# Patient Record
Sex: MALE | Race: WHITE
[De-identification: names, ages, dates, MRNs, and addresses within clinical notes are randomized per-mention and may not be internally consistent; named-entity substitution may affect disease eponyms.]

---

## 2018-12-08 ENCOUNTER — HOSPITAL ENCOUNTER (OUTPATIENT)
Dept: HOSPITAL 53 - M SLEEP | Age: 58
End: 2018-12-08
Payer: COMMERCIAL

## 2018-12-08 DIAGNOSIS — G47.33: Primary | ICD-10-CM

## 2018-12-08 PROCEDURE — 95811 POLYSOM 6/>YRS CPAP 4/> PARM: CPT

## 2019-01-23 ENCOUNTER — HOSPITAL ENCOUNTER (OUTPATIENT)
Dept: HOSPITAL 53 - M LAB REF | Age: 59
End: 2019-01-23
Attending: FAMILY MEDICINE
Payer: COMMERCIAL

## 2019-01-23 DIAGNOSIS — R68.82: Primary | ICD-10-CM

## 2019-10-24 ENCOUNTER — HOSPITAL ENCOUNTER (OUTPATIENT)
Dept: HOSPITAL 53 - M LAB REF | Age: 59
End: 2019-10-24
Attending: FAMILY MEDICINE
Payer: COMMERCIAL

## 2019-10-24 DIAGNOSIS — R53.83: ICD-10-CM

## 2019-10-24 DIAGNOSIS — M25.50: Primary | ICD-10-CM

## 2019-10-24 LAB
CRP SERPL-MCNC: < 0.3 MG/DL (ref 0–0.3)
RHEUMATOID FACT SERPL-ACNC: < 10 IU/ML (ref ?–15)

## 2019-10-26 LAB
B BURGDOR IGG+IGM SER-ACNC: <0.91 ISR (ref 0–0.9)
B BURGDOR IGM SER IA-ACNC: <0.8 INDEX (ref 0–0.79)
DSDNA AB SER-ACNC: 1 IU/ML (ref 0–9)
ENA RNP AB SER-ACNC: 1.3 AI (ref 0–0.9)
ENA SM AB SER-ACNC: <0.2 AI (ref 0–0.9)
ENA SS-A AB SER-ACNC: <0.2 AI (ref 0–0.9)
ENA SS-B AB SER-ACNC: <0.2 AI (ref 0–0.9)

## 2020-08-26 ENCOUNTER — HOSPITAL ENCOUNTER (OUTPATIENT)
Dept: HOSPITAL 53 - M LAB REF | Age: 60
End: 2020-08-26
Attending: FAMILY MEDICINE
Payer: COMMERCIAL

## 2020-08-26 DIAGNOSIS — Z79.899: Primary | ICD-10-CM

## 2020-08-28 LAB — RBC # BLD AUTO: 5.2 X10E6/UL (ref 4.14–5.8)

## 2021-04-19 ENCOUNTER — HOSPITAL ENCOUNTER (OUTPATIENT)
Dept: HOSPITAL 53 - M LAB REF | Age: 61
End: 2021-04-19
Attending: FAMILY MEDICINE
Payer: COMMERCIAL

## 2021-04-19 DIAGNOSIS — Z79.899: ICD-10-CM

## 2021-04-19 DIAGNOSIS — M06.9: ICD-10-CM

## 2021-04-19 DIAGNOSIS — Z51.81: Primary | ICD-10-CM

## 2021-07-02 ENCOUNTER — HOSPITAL ENCOUNTER (EMERGENCY)
Dept: HOSPITAL 53 - M ED | Age: 61
Discharge: HOME | End: 2021-07-02
Payer: COMMERCIAL

## 2021-07-02 VITALS — DIASTOLIC BLOOD PRESSURE: 94 MMHG | SYSTOLIC BLOOD PRESSURE: 165 MMHG

## 2021-07-02 VITALS — HEIGHT: 68 IN | WEIGHT: 283.07 LBS | BODY MASS INDEX: 42.9 KG/M2

## 2021-07-02 DIAGNOSIS — Z88.8: ICD-10-CM

## 2021-07-02 DIAGNOSIS — F41.9: ICD-10-CM

## 2021-07-02 DIAGNOSIS — F32.9: ICD-10-CM

## 2021-07-02 DIAGNOSIS — R51.9: ICD-10-CM

## 2021-07-02 DIAGNOSIS — L03.90: Primary | ICD-10-CM

## 2021-07-02 LAB
BASOPHILS NFR BLD MANUAL: 1 % (ref 0–1)
BUN SERPL-MCNC: 14 MG/DL (ref 7–18)
CALCIUM SERPL-MCNC: 8.4 MG/DL (ref 8.5–10.1)
CHLORIDE SERPL-SCNC: 104 MEQ/L (ref 98–107)
CO2 SERPL-SCNC: 25 MEQ/L (ref 21–32)
CREAT SERPL-MCNC: 1.24 MG/DL (ref 0.7–1.3)
CRP SERPL-MCNC: 2.99 MG/DL (ref 0–0.3)
EOSINOPHIL NFR BLD MANUAL: 2 % (ref 0–3)
ERYTHROCYTE [SEDIMENTATION RATE] IN BLOOD BY WESTERGREN METHOD: 25 MM/HR (ref 0–20)
GFR SERPL CREATININE-BSD FRML MDRD: > 60 ML/MIN/{1.73_M2} (ref 56–?)
GLUCOSE SERPL-MCNC: 93 MG/DL (ref 70–100)
HCT VFR BLD AUTO: 44.1 % (ref 42–52)
HETEROPH AB SER QL LA: NEGATIVE
HGB BLD-MCNC: 15.2 G/DL (ref 13.5–17.5)
LYMPHOCYTES NFR BLD MANUAL: 17 % (ref 16–44)
MCH RBC QN AUTO: 32.3 PG (ref 27–33)
MCHC RBC AUTO-ENTMCNC: 34.5 G/DL (ref 32–36.5)
MCV RBC AUTO: 93.6 FL (ref 80–96)
MONOCYTES NFR BLD MANUAL: 5 % (ref 0–5)
NEUTROPHILS NFR BLD MANUAL: 64 % (ref 28–66)
PLATELET # BLD AUTO: 226 10^3/UL (ref 150–450)
PLATELET BLD QL SMEAR: NORMAL
POTASSIUM SERPL-SCNC: 3.8 MEQ/L (ref 3.5–5.1)
RBC # BLD AUTO: 4.71 10^6/UL (ref 4.3–6.1)
SODIUM SERPL-SCNC: 137 MEQ/L (ref 136–145)
VARIANT LYMPHS NFR BLD MANUAL: 11 % (ref 0–5)
WBC # BLD AUTO: 10.2 10^3/UL (ref 4–10)

## 2021-07-03 LAB
APPEARANCE UR: CLEAR
BACTERIA UR QL AUTO: NEGATIVE
BILIRUB UR QL STRIP.AUTO: NEGATIVE
GLUCOSE UR QL STRIP.AUTO: NEGATIVE MG/DL
HGB UR QL STRIP.AUTO: (no result)
KETONES UR QL STRIP.AUTO: NEGATIVE MG/DL
LEUKOCYTE ESTERASE UR QL STRIP.AUTO: NEGATIVE
MUCOUS THREADS URNS QL MICRO: (no result)
NITRITE UR QL STRIP.AUTO: NEGATIVE
PH UR STRIP.AUTO: 5 UNITS (ref 5–9)
PROT UR QL STRIP.AUTO: NEGATIVE MG/DL
RBC # UR AUTO: 1 /HPF (ref 0–3)
SP GR UR STRIP.AUTO: 1.02 (ref 1–1.03)
SQUAMOUS #/AREA URNS AUTO: 0 /HPF (ref 0–6)
UROBILINOGEN UR QL STRIP.AUTO: 0.2 MG/DL (ref 0–2)
WBC #/AREA URNS AUTO: 0 /HPF (ref 0–3)

## 2021-07-03 NOTE — ECGEPIP
[facility] - ED

                                       

                                       Test Date:    2021

Pat Name:     SHELBY SHI            Department:   

Patient ID:   [id number]                 Room:         -

Gender:       Male                     Technician:   MURIEL

:          1960               Requested By: SHAWNA MURRAY PA-C.

Order Number: [id number]     Reading MD:   Maja Lundborg-Gray

                                 Measurements

Intervals                              Axis          

Rate:         67                       P:            20

OR:           154                      QRS:          6

QRSD:         80                       T:            -6

QT:           374                                    

QTc:          395                                    

                           Interpretive Statements

Normal sinus rhythm

Nonspecific ST T wave changes 

No prior ECG for comparison

Electronically Signed on 7-3-2021 6:16:13 EDT by Maja Lundborg-Gray

## 2021-07-06 LAB — EBV NA IGG SER-ACNC: >600 U/ML (ref 0–17.9)

## 2021-07-07 LAB
B BURGDOR IGG+IGM SER-ACNC: 2.58 ISR (ref 0–0.9)
B BURGDOR IGM SER IA-ACNC: 6.81 INDEX (ref 0–0.79)

## 2021-11-03 ENCOUNTER — HOSPITAL ENCOUNTER (OUTPATIENT)
Dept: HOSPITAL 53 - M LAB REF | Age: 61
End: 2021-11-03
Attending: FAMILY MEDICINE
Payer: COMMERCIAL

## 2021-11-03 DIAGNOSIS — R31.9: Primary | ICD-10-CM

## 2021-11-03 LAB
APPEARANCE UR: CLEAR
BACTERIA UR QL AUTO: NEGATIVE
BILIRUB UR QL STRIP.AUTO: NEGATIVE
CAOX CRY URNS QL MICRO: (no result)
GLUCOSE UR QL STRIP.AUTO: NEGATIVE MG/DL
HGB UR QL STRIP.AUTO: NEGATIVE
KETONES UR QL STRIP.AUTO: NEGATIVE MG/DL
LEUKOCYTE ESTERASE UR QL STRIP.AUTO: NEGATIVE
MUCOUS THREADS URNS QL MICRO: (no result)
NITRITE UR QL STRIP.AUTO: NEGATIVE
PH UR STRIP.AUTO: 7 UNITS (ref 5–9)
PROT UR QL STRIP.AUTO: NEGATIVE MG/DL
RBC # UR AUTO: 0 /HPF (ref 0–3)
SP GR UR STRIP.AUTO: 1.02 (ref 1–1.03)
SQUAMOUS #/AREA URNS AUTO: 0 /HPF (ref 0–6)
UROBILINOGEN UR QL STRIP.AUTO: 0.2 MG/DL (ref 0–2)
WBC #/AREA URNS AUTO: 0 /HPF (ref 0–3)

## 2021-11-15 ENCOUNTER — HOSPITAL ENCOUNTER (OUTPATIENT)
Dept: HOSPITAL 53 - M WUC | Age: 61
End: 2021-11-15
Attending: FAMILY MEDICINE
Payer: COMMERCIAL

## 2021-11-15 DIAGNOSIS — R06.02: Primary | ICD-10-CM

## 2021-11-15 NOTE — REP
INDICATION:

SHORTNESS OF BREATH



COMPARISON:

08/06/2009



TECHNIQUE:

PA and lateral.



FINDINGS:

The mediastinum and cardiac silhouette are normal.  The lung fields demonstrate

chronic appearing changes.  No obvious acute consolidation or effusion.  No

pneumothorax.  The skeletal structures are intact and normal.



IMPRESSION:

No acute cardiopulmonary process.





<Electronically signed by Elvin Mireles > 11/15/21 0901

## 2022-06-15 ENCOUNTER — HOSPITAL ENCOUNTER (OUTPATIENT)
Dept: HOSPITAL 53 - M LAB REF | Age: 62
End: 2022-06-15
Attending: STUDENT IN AN ORGANIZED HEALTH CARE EDUCATION/TRAINING PROGRAM
Payer: COMMERCIAL

## 2022-06-15 DIAGNOSIS — R53.83: ICD-10-CM

## 2022-06-15 DIAGNOSIS — G62.9: Primary | ICD-10-CM

## 2022-06-15 LAB
PROT SERPL-MCNC: 7.6 GM/DL (ref 6.4–8.2)
VIT B12 SERPL-MCNC: > 2000 PG/ML (ref 247–911)

## 2022-06-16 LAB
ALBUMIN MFR UR ELPH: 60.4 % (ref 55.8–66.1)
ALBUMIN SERPL-MCNC: 4.59 GM/DL (ref 3.29–5.55)
ALPHA1 GLOB MFR SERPL ELPH: 3.1 % (ref 2.9–4.9)
ALPHA1 GLOB SERPL ELPH-MCNC: 0.24 GM/DL (ref 0.17–0.41)
ALPHA2 GLOB SERPL ELPH-MCNC: 0.74 GM/DL (ref 0.42–0.99)
ALPHA2 GLOB SERPL ELPH-MCNC: 9.7 % (ref 7.1–11.8)
B-GLOBULIN SERPL ELPH-MCNC: 0.42 GM/DL (ref 0.28–0.6)
BETA1 GLOB MFR SERPL ELPH: 5.5 % (ref 4.7–7.2)
BETA2 GLOB MFR SERPL ELPH: 4.3 % (ref 3.2–6.5)
BETA2 GLOB SERPL ELPH-MCNC: 0.33 GM/DL (ref 0.19–0.55)
GAMMA GLOB 24H MFR UR ELPH: 17 % (ref 11.1–18.8)
GAMMA GLOB SERPL ELPH-MCNC: 1.29 GM/DL (ref 0.65–1.58)
KAPPA LC FREE SER-MCNC: 20.4 MG/L (ref 3.3–19.4)
KAPPA LC/LAMBDA SER: 1.31 {RATIO} (ref 0.26–1.65)
LAMBDA LC FREE SERPL-MCNC: 15.6 MG/L (ref 5.7–26.3)

## 2022-07-09 ENCOUNTER — HOSPITAL ENCOUNTER (EMERGENCY)
Dept: HOSPITAL 53 - M ED | Age: 62
Discharge: HOME | End: 2022-07-09
Payer: COMMERCIAL

## 2022-07-09 VITALS — SYSTOLIC BLOOD PRESSURE: 151 MMHG | DIASTOLIC BLOOD PRESSURE: 95 MMHG

## 2022-07-09 VITALS — HEIGHT: 68 IN | BODY MASS INDEX: 45.56 KG/M2 | WEIGHT: 300.62 LBS

## 2022-07-09 DIAGNOSIS — I10: ICD-10-CM

## 2022-07-09 DIAGNOSIS — H53.8: Primary | ICD-10-CM

## 2022-07-09 DIAGNOSIS — Z88.8: ICD-10-CM

## 2022-07-09 DIAGNOSIS — Z79.899: ICD-10-CM

## 2022-07-13 ENCOUNTER — HOSPITAL ENCOUNTER (OUTPATIENT)
Dept: HOSPITAL 53 - M LAB REF | Age: 62
End: 2022-07-13
Attending: FAMILY MEDICINE
Payer: COMMERCIAL

## 2022-07-13 DIAGNOSIS — M25.50: ICD-10-CM

## 2022-07-13 DIAGNOSIS — G62.9: Primary | ICD-10-CM

## 2022-07-13 LAB
CORTIS BS SERPL-MCNC: 6.8 UG/DL (ref 4.3–22.4)
CRP SERPL-MCNC: < 0.3 MG/DL (ref 0–0.3)

## 2023-01-11 ENCOUNTER — HOSPITAL ENCOUNTER (OUTPATIENT)
Dept: HOSPITAL 53 - M LAB REF | Age: 63
End: 2023-01-11
Attending: FAMILY MEDICINE
Payer: COMMERCIAL

## 2023-01-11 DIAGNOSIS — Z00.00: Primary | ICD-10-CM

## 2023-01-11 DIAGNOSIS — M06.9: ICD-10-CM

## 2024-06-18 ENCOUNTER — HOSPITAL ENCOUNTER (OUTPATIENT)
Dept: HOSPITAL 53 - M PLAIMG | Age: 64
End: 2024-06-18
Attending: NURSE PRACTITIONER
Payer: COMMERCIAL

## 2024-06-18 DIAGNOSIS — R91.8: ICD-10-CM

## 2024-06-18 DIAGNOSIS — R06.02: Primary | ICD-10-CM

## 2024-06-18 DIAGNOSIS — J84.10: ICD-10-CM

## 2024-07-17 ENCOUNTER — HOSPITAL ENCOUNTER (OUTPATIENT)
Dept: HOSPITAL 53 - M LAB REF | Age: 64
End: 2024-07-17
Attending: FAMILY MEDICINE
Payer: COMMERCIAL

## 2024-07-17 DIAGNOSIS — E07.9: Primary | ICD-10-CM

## 2025-07-14 ENCOUNTER — HOSPITAL ENCOUNTER (EMERGENCY)
Dept: HOSPITAL 53 - M ED | Age: 65
Discharge: ANOTHER HOSPITAL | End: 2025-07-14
Payer: COMMERCIAL

## 2025-07-14 VITALS — WEIGHT: 315 LBS | HEIGHT: 68 IN | BODY MASS INDEX: 47.74 KG/M2

## 2025-07-14 VITALS — DIASTOLIC BLOOD PRESSURE: 61 MMHG | OXYGEN SATURATION: 97 % | TEMPERATURE: 97.9 F | SYSTOLIC BLOOD PRESSURE: 117 MMHG

## 2025-07-14 DIAGNOSIS — R00.0: ICD-10-CM

## 2025-07-14 DIAGNOSIS — G47.33: ICD-10-CM

## 2025-07-14 DIAGNOSIS — E66.9: ICD-10-CM

## 2025-07-14 DIAGNOSIS — J90: ICD-10-CM

## 2025-07-14 DIAGNOSIS — E11.9: ICD-10-CM

## 2025-07-14 DIAGNOSIS — Z98.84: ICD-10-CM

## 2025-07-14 DIAGNOSIS — A41.9: Primary | ICD-10-CM

## 2025-07-14 DIAGNOSIS — K91.89: ICD-10-CM

## 2025-07-14 DIAGNOSIS — Z88.8: ICD-10-CM

## 2025-07-14 DIAGNOSIS — I31.39: ICD-10-CM

## 2025-07-14 DIAGNOSIS — I51.7: ICD-10-CM

## 2025-07-14 LAB
ALBUMIN SERPL BCG-MCNC: 3.3 G/DL (ref 3.2–5.2)
ALP SERPL-CCNC: 35 U/L (ref 40–129)
ALT SERPL W P-5'-P-CCNC: 70 U/L (ref 7–40)
ANISOCYTOSIS BLD QL SMEAR: (no result)
AST SERPL-CCNC: 50 U/L (ref ?–34)
AUER BODIES BLD QL SMEAR: (no result)
BASO STIPL BLD QL SMEAR: (no result)
BASOPHILS # BLD AUTO: (no result) 10^3/UL (ref 0–0.2)
BASOPHILS NFR BLD AUTO: (no result) % (ref 0–1)
BASOPHILS NFR BLD MANUAL: (no result) % (ref 0–1)
BILIRUB CONJ SERPL-MCNC: 1 MG/DL (ref ?–0.4)
BILIRUB SERPL-MCNC: 2.1 MG/DL (ref 0.3–1.2)
BLASTS NFR BLD MANUAL: (no result) % (ref 0–0)
BURR CELLS BLD QL SMEAR: (no result)
BURR CELLS BLD QL SMEAR: (no result)
DACRYOCYTES BLD QL SMEAR: (no result)
DOHLE BOD BLD QL SMEAR: (no result)
EOSINOPHIL # BLD AUTO: (no result) 10^3/UL (ref 0–0.5)
EOSINOPHIL NFR BLD AUTO: (no result) % (ref 0–3)
EOSINOPHIL NFR BLD MANUAL: (no result) % (ref 0–3)
GIANT PLATELETS BLD QL SMEAR: (no result)
HCT VFR BLD AUTO: 52.6 % (ref 42–52)
HELMET CELLS BLD QL SMEAR: (no result)
HGB BLD-MCNC: 18 G/DL (ref 13.5–17.5)
HGB S BLD QL SOLY: (no result)
HOWELL-JOLLY BOD BLD QL SMEAR: (no result)
HYPOCHROMIA BLD QL SMEAR: (no result)
KETONES UR STRIP.AUTO-MCNC: (no result) MG/DL
LEUCINE CRYSTALS [PRESENCE] IN URINE BY COMPUTER ASSISTED METHOD: (no result)
LEUKOCYTE ESTERASE UR QL STRIP.AUTO: NEGATIVE
LIPASE SERPL-CCNC: 18 U/L (ref 12–53)
LYMPHOCYTES # BLD AUTO: (no result) 10^3/UL (ref 1.5–5)
LYMPHOCYTES NFR BLD AUTO: (no result) % (ref 24–44)
LYMPHOCYTES NFR BLD MANUAL: 27 % (ref 16–44)
MACROCYTES BLD QL SMEAR: (no result)
MCH RBC QN AUTO: 31.9 PG (ref 27–33)
MCHC RBC AUTO-ENTMCNC: 34.2 G/DL (ref 32–36.5)
MCV RBC AUTO: 93.3 FL (ref 80–96)
METAMYELOCYTES NFR BLD MANUAL: 1 % (ref 0–0)
MICROCYTES BLD QL SMEAR: (no result)
MONOCYTES # BLD AUTO: (no result) 10^3/UL (ref 0–0.8)
MONOCYTES NFR BLD AUTO: (no result) % (ref 2–8)
MONOCYTES NFR BLD MANUAL: 7 % (ref 0–5)
MUCOUS THREADS UR QL AUTO: (no result)
MYELOBLASTS NFR BLD MANUAL: (no result) % (ref 0–0)
NEUTROPHILS # BLD AUTO: (no result) 10^3/UL (ref 1.5–8.5)
NEUTROPHILS NFR BLD AUTO: (no result) % (ref 36–66)
NEUTROPHILS NFR BLD MANUAL: 40 % (ref 28–66)
NEUTS HYPERSEG BLD QL SMEAR: (no result)
NITRITE UR QL STRIP.AUTO: NEGATIVE
NRBC BLD MANUAL-RTO: (no result) % (ref 0–0)
OTHER CELLS NFR BLD MANUAL: (no result) % (ref 0–0)
OVALOCYTES BLD QL SMEAR: (no result)
PLASMA CELLS BLD QL SMEAR: (no result) % (ref 0–0)
PLATELET # BLD AUTO: 254 10^3/UL (ref 150–450)
PLATELET BLD QL SMEAR: NORMAL
PLATELET CLUMP BLD QL SMEAR: (no result)
POIKILOCYTOSIS BLD QL SMEAR: (no result)
POLYCHROMASIA BLD QL SMEAR: (no result)
PROCALCITONIN SERPL-MCNC: 22.07 NG/ML
PROMYELOCYTES # BLD MANUAL: (no result) % (ref 0–0)
PROT SERPL-MCNC: 7.1 G/DL (ref 5.7–8.2)
RBC # BLD AUTO: 5.64 10^6/UL (ref 4.3–6.1)
RBC # UR AUTO: 1 /HPF (ref 0–3)
RBC MORPH BLD: NORMAL
ROULEAUX BLD QL SMEAR: (no result)
SCHISTOCYTES BLD QL SMEAR: (no result)
SMUDGE CELLS BLD QL SMEAR: (no result)
SPHEROCYTES BLD QL SMEAR: (no result)
SQUAMOUS UR QL AUTO: 0 /HPF (ref 0–6)
STOMATOCYTES BLD QL SMEAR: (no result)
TARGETS BLD QL SMEAR: (no result)
TOXIC GRANULES BLD QL SMEAR: (no result)
TRANS CELLS UR QL COMP ASSIST: (no result) /HPF
TRI-PHOS CRY UR QL COMP ASSIST: (no result)
TYROSINE CRYSTALS [PRESENCE] IN URINE BY COMPUTER ASSISTED METHOD: (no result)
URATE CRY UR QL COMP ASSIST: (no result)
VARIANT LYMPHS NFR BLD MANUAL: 2 % (ref 0–5)
WAXY CASTS #/AREA UR COMP ASSIST: (no result) /LPF
WBC # BLD AUTO: 6 10^3/UL (ref 4–10)
WBC TOXIC VACUOLES BLD QL SMEAR: (no result)
WBC UR QL AUTO: 1 /HPF (ref 0–3)
YEAST UR QL AUTO: (no result)

## 2025-07-14 PROCEDURE — 93005 ELECTROCARDIOGRAM TRACING: CPT

## 2025-07-14 PROCEDURE — 74176 CT ABD & PELVIS W/O CONTRAST: CPT

## 2025-07-14 PROCEDURE — 51702 INSERT TEMP BLADDER CATH: CPT

## 2025-07-14 PROCEDURE — 83605 ASSAY OF LACTIC ACID: CPT

## 2025-07-14 PROCEDURE — 86901 BLOOD TYPING SEROLOGIC RH(D): CPT

## 2025-07-14 PROCEDURE — 80047 BASIC METABLC PNL IONIZED CA: CPT

## 2025-07-14 PROCEDURE — 96375 TX/PRO/DX INJ NEW DRUG ADDON: CPT

## 2025-07-14 PROCEDURE — 96361 HYDRATE IV INFUSION ADD-ON: CPT

## 2025-07-14 PROCEDURE — 86900 BLOOD TYPING SEROLOGIC ABO: CPT

## 2025-07-14 PROCEDURE — 87040 BLOOD CULTURE FOR BACTERIA: CPT

## 2025-07-14 PROCEDURE — 86850 RBC ANTIBODY SCREEN: CPT

## 2025-07-14 PROCEDURE — 96365 THER/PROPH/DIAG IV INF INIT: CPT

## 2025-07-14 PROCEDURE — 81001 URINALYSIS AUTO W/SCOPE: CPT

## 2025-07-14 PROCEDURE — 71045 X-RAY EXAM CHEST 1 VIEW: CPT

## 2025-07-14 PROCEDURE — 83690 ASSAY OF LIPASE: CPT

## 2025-07-14 PROCEDURE — 71250 CT THORAX DX C-: CPT

## 2025-07-14 PROCEDURE — 85025 COMPLETE CBC W/AUTO DIFF WBC: CPT

## 2025-07-14 PROCEDURE — 84145 PROCALCITONIN (PCT): CPT

## 2025-07-14 PROCEDURE — 99285 EMERGENCY DEPT VISIT HI MDM: CPT

## 2025-07-14 PROCEDURE — 80076 HEPATIC FUNCTION PANEL: CPT

## 2025-07-14 RX ADMIN — SODIUM CHLORIDE ONE MLS/HR: 9 INJECTION, SOLUTION INTRAVENOUS at 10:02

## 2025-07-14 RX ADMIN — PIPERACILLIN AND TAZOBACTAM ONE MLS/HR: 4; .5 INJECTION, POWDER, FOR SOLUTION INTRAVENOUS at 10:36

## 2025-07-14 RX ADMIN — FENTANYL CITRATE ONE MCG: 50 INJECTION INTRAMUSCULAR; INTRAVENOUS at 10:37
